# Patient Record
Sex: FEMALE | Race: BLACK OR AFRICAN AMERICAN | Employment: OTHER | URBAN - METROPOLITAN AREA
[De-identification: names, ages, dates, MRNs, and addresses within clinical notes are randomized per-mention and may not be internally consistent; named-entity substitution may affect disease eponyms.]

---

## 2017-03-27 DIAGNOSIS — I10 ESSENTIAL HYPERTENSION: ICD-10-CM

## 2017-03-27 RX ORDER — LISINOPRIL 40 MG/1
TABLET ORAL
Qty: 30 TAB | Refills: 0 | Status: SHIPPED | OUTPATIENT
Start: 2017-03-27 | End: 2017-04-22 | Stop reason: SDUPTHER

## 2017-03-27 NOTE — TELEPHONE ENCOUNTER
Attempted to contact patient without success.  Left voicemail message requesting a call back to the office to schedule a HTN follow up in the next 3 weeks

## 2017-04-25 ENCOUNTER — OFFICE VISIT (OUTPATIENT)
Dept: INTERNAL MEDICINE CLINIC | Age: 67
End: 2017-04-25

## 2017-04-25 VITALS
TEMPERATURE: 98.2 F | HEART RATE: 76 BPM | RESPIRATION RATE: 18 BRPM | BODY MASS INDEX: 24.94 KG/M2 | HEIGHT: 60 IN | SYSTOLIC BLOOD PRESSURE: 130 MMHG | OXYGEN SATURATION: 98 % | DIASTOLIC BLOOD PRESSURE: 72 MMHG | WEIGHT: 127 LBS

## 2017-04-25 VITALS
WEIGHT: 124 LBS | RESPIRATION RATE: 18 BRPM | OXYGEN SATURATION: 98 % | BODY MASS INDEX: 24.35 KG/M2 | HEIGHT: 60 IN | SYSTOLIC BLOOD PRESSURE: 130 MMHG | HEART RATE: 76 BPM | DIASTOLIC BLOOD PRESSURE: 82 MMHG | TEMPERATURE: 98.2 F

## 2017-04-25 DIAGNOSIS — I25.83 CORONARY ARTERY DISEASE DUE TO LIPID RICH PLAQUE: ICD-10-CM

## 2017-04-25 DIAGNOSIS — Z13.39 SCREENING FOR ALCOHOLISM: ICD-10-CM

## 2017-04-25 DIAGNOSIS — I10 ESSENTIAL HYPERTENSION: ICD-10-CM

## 2017-04-25 DIAGNOSIS — Z00.00 ROUTINE GENERAL MEDICAL EXAMINATION AT A HEALTH CARE FACILITY: ICD-10-CM

## 2017-04-25 DIAGNOSIS — Z12.39 BREAST CANCER SCREENING: Primary | ICD-10-CM

## 2017-04-25 DIAGNOSIS — Z11.59 ENCOUNTER FOR HEPATITIS C SCREENING TEST FOR LOW RISK PATIENT: ICD-10-CM

## 2017-04-25 DIAGNOSIS — Z12.11 COLON CANCER SCREENING: ICD-10-CM

## 2017-04-25 DIAGNOSIS — I25.10 CORONARY ARTERY DISEASE DUE TO LIPID RICH PLAQUE: ICD-10-CM

## 2017-04-25 NOTE — PROGRESS NOTES
Chief Complaint   Patient presents with    Hypertension     1. Have you been to the ER, urgent care clinic since your last visit? No  Hospitalized since your last visit? No    2. Have you seen or consulted any other health care providers outside of the [address] [place] [address] since your last visit? No Include any pap smears or colon screening.  No

## 2017-04-25 NOTE — MR AVS SNAPSHOT
Visit Information Date & Time Provider Department Dept. Phone Encounter #  
 4/25/2017 11:00 AM Yumi Rees MD [facility] [phone] [id number] Follow-up Instructions Return in about 6 months (around 10/25/2017) for F/U HTN. Upcoming Health Maintenance Date Due Hepatitis C Screening 1950 COLONOSCOPY 2/7/1967 DTaP/Tdap/Td series (1 - Tdap) 2/7/1970 Pneumococcal 65+ Low/Medium Risk (1 of 2 - PCV13) 2/7/2014 MEDICARE YEARLY EXAM 2/7/2014 BREAST CANCER SCRN MAMMOGRAM 7/26/2015 GLAUCOMA SCREENING Q2Y 11/12/2016 Allergies as of 4/25/2017  Review Complete On: 4/25/2017 By: Yumi Rees MD  
 No Known Allergies Current Immunizations  Never Reviewed No immunizations on file. Not reviewed this visit You Were Diagnosed With   
  
 Codes Comments Breast cancer screening    -  Primary ICD-10-CM: Z12.39 
ICD-9-CM: V76.10 Routine general medical examination at a health care facility     ICD-10-CM: Z00.00 ICD-9-CM: V70.0 Screening for alcoholism     ICD-10-CM: Z13.89 ICD-9-CM: V79.1 Colon cancer screening     ICD-10-CM: Z12.11 ICD-9-CM: V76.51 Encounter for hepatitis C screening test for low risk patient     ICD-10-CM: Z11.59 
ICD-9-CM: V73.89 Vitals BP Pulse Temp Resp Height(growth percentile) Weight(growth percentile) 130/72 (BP 1 Location: Left arm, BP Patient Position: Sitting) 76 98.2 °F (36.8 °C) (Oral) 18 5' (1.524 m) 127 lb (57.6 kg) SpO2 BMI OB Status Smoking Status 98% 24.8 kg/m2 Postmenopausal Never Smoker BMI and BSA Data Body Mass Index Body Surface Area  
 24.8 kg/m 2 1.56 m 2 Preferred Pharmacy Pharmacy Name Phone [id number] [place] [phone] Your Updated Medication List  
  
   
This list is accurate as of: 4/25/17 11:59 AM.  Always use your most recent med list.  
  
  
  
  
 atorvastatin 80 mg tablet Commonly known as:  LIPITOR Take 80 mg by mouth daily. bisoprolol-hydroCHLOROthiazide 10-6.25 mg per tablet Commonly known as:  [facility] TAKE 1 TABLET BY MOUTH EVERY DAY  
  
 diltiazem hcl 240 mg Tb24 Take  by mouth. DULoxetine 30 mg capsule Commonly known as:  CYMBALTA Take 1 Cap by mouth daily. Indications: CHRONIC MUSCULOSKELETAL PAIN, FIBROMYALGIA  
  
 eplerenone 25 mg tablet Commonly known as:  [place] [facility] Take one tablet by mouth once daily  
  
 hydrocortisone valerate 0.2 % ointment Commonly known as:  WEST-CORRIE Apply  to affected area daily. lisinopril 40 mg tablet Commonly known as:  PRINIVIL, ZESTRIL  
TAKE 1 TABLET BY MOUTH EVERY DAY  
  
 NITROSTAT 0.4 mg SL tablet Generic drug:  nitroglycerin  
by SubLINGual route every five (5) minutes as needed for Chest Pain. RESTASIS 0.05 % ophthalmic emulsion Generic drug:  cycloSPORINE Administer 1 drop to both eyes two (2) times a day. We Performed the Following CBC WITH AUTOMATED DIFF [34456 CPT(R)] HCV AB W/RFLX TO KRZYSZTOF [20557 CPT(R)] LIPID PANEL [53426 CPT(R)] METABOLIC PANEL, COMPREHENSIVE [75250 CPT(R)] REFERRAL TO GASTROENTEROLOGY [QUB24 Custom] TSH REFLEX TO T4 [[id number] Custom] URINALYSIS W/ RFLX MICROSCOPIC [76470 CPT(R)] Follow-up Instructions Return in about 6 months (around 10/25/2017) for F/U HTN. To-Do List   
 04/25/2017 Imaging:  [facility] MAMMO BI SCREENING INCL CAD Referral Information Referral ID Referred By Referred To  
  
 [id number] [facility] [address] [place] [address] [phone] Visits Status Start Date End Date 1 New Request 4/25/17 4/25/18 If your referral has a status of pending review or denied, additional information will be sent to support the outcome of this decision. Patient Instructions Follow a low salt diet. Stay physically active. Have a mammogram done. Have a colonoscopy scheduled. Have blood test analysis today. Continue your current medications. Introducing [facility]! [facility] introduces [employer] patient portal. Now you can access parts of your medical record, email your doctor's office, and request medication refills online. 1. In your internet browser, go to [email or web address] 2. Click on the First Time User? Click Here link in the Sign In box. You will see the New Member Sign Up page. 3. Enter your [employer] Access Code exactly as it appears below. You will not need to use this code after youve completed the sign-up process. If you do not sign up before the expiration date, you must request a new code. · [employer] Access Code: [email or web address] Expires: 7/24/2017 11:59 AM 
 
4. Enter the last four digits of your Social Security Number (xxxx) and Date of Birth (mm/dd/yyyy) as indicated and click Submit. You will be taken to the next sign-up page. 5. Create a [employer] ID. This will be your [employer] login ID and cannot be changed, so think of one that is secure and easy to remember. 6. Create a [employer] password. You can change your password at any time. 7. Enter your Password Reset Question and Answer. This can be used at a later time if you forget your password. 8. Enter your e-mail address. You will receive e-mail notification when new information is available in [address] 9. Click Sign Up. You can now view and download portions of your medical record. 10. Click the Download Summary menu link to download a portable copy of your medical information. If you have questions, please visit the Frequently Asked Questions section of the [employer] website. Remember, [employer] is NOT to be used for urgent needs. For medical emergencies, dial 911. Now available from your iPhone and Android! Please provide this summary of care documentation to your next provider. Your primary care clinician is listed as Jesus Jacobs. If you have any questions after today's visit, please call [phone].

## 2017-04-25 NOTE — PROGRESS NOTES
Subjective:     Chief Complaint   Patient presents with    Hypertension     She  is a 75y.o. year old female who presents for evaluation. Has ringing in her ears. Is overdue for mammogram and colonoscopy. Historical Data    Past Medical History:   Diagnosis Date    CAD (coronary artery disease)     HTN (hypertension)     Osteoporosis         Past Surgical History:   Procedure Laterality Date    CARDIAC SURG PROCEDURE UNLIST      2 stents placed at the first of the year.  HX APPENDECTOMY      HX CHOLECYSTECTOMY          Outpatient Encounter Prescriptions as of 4/25/2017   Medication Sig Dispense Refill    lisinopril (PRINIVIL, ZESTRIL) 40 mg tablet TAKE 1 TABLET BY MOUTH EVERY DAY 30 Tab 5    bisoprolol-hydroCHLOROthiazide (ZIAC) 10-6.25 mg per tablet TAKE 1 TABLET BY MOUTH EVERY DAY 90 Tab 1    hydrocortisone valerate (WEST-CORRIE) 0.2 % ointment Apply  to affected area daily. 15 g 0    DULoxetine (CYMBALTA) 30 mg capsule Take 1 Cap by mouth daily. Indications: CHRONIC MUSCULOSKELETAL PAIN, FIBROMYALGIA 30 Cap 1    diltiazem hcl 240 mg Tb24 Take  by mouth.  atorvastatin (LIPITOR) 80 mg tablet Take 80 mg by mouth daily.  cycloSPORINE (RESTASIS) 0.05 % ophthalmic emulsion Administer 1 drop to both eyes two (2) times a day.  nitroglycerin (NITROSTAT) 0.4 mg SL tablet by SubLINGual route every five (5) minutes as needed for Chest Pain.  eplerenone (INSPRA) 25 mg tablet Take one tablet by mouth once daily 90 tablet 3     No facility-administered encounter medications on file as of 4/25/2017. No Known Allergies     Social History     Social History    Marital status: SINGLE     Spouse name: N/A    Number of children: N/A    Years of education: N/A     Occupational History    Not on file.      Social History Main Topics    Smoking status: Never Smoker    Smokeless tobacco: Never Used    Alcohol use No    Drug use: No    Sexual activity: Not Currently     Other Topics Concern    Not on file     Social History Narrative        Review of Systems  A comprehensive review of systems was negative except for that written in the HPI. Objective:     Vitals:    04/25/17 1134   BP: 130/72   Pulse: 76   Resp: 18   Temp: 98.2 °F (36.8 °C)   TempSrc: Oral   SpO2: 98%   Weight: 127 lb (57.6 kg)   Height: 5' (1.524 m)     Pleasant AAF in no acute distress. Neck: Supple. Cardiac: RRR without murmurs, gallops or rubs. Lungs: Clear to auscultation. Breasts (chaperone): No masses. Abdomen: Soft and nontender  Neuro: Intact. ASSESSMENT / PLAN:   1. Routine general medical examination at a health care facility  · Wellness exam completed  - CBC WITH AUTOMATED DIFF  - LIPID PANEL  - TSH REFLEX TO T4  - METABOLIC PANEL, COMPREHENSIVE  - URINALYSIS W/ RFLX MICROSCOPIC    2. Screening for alcoholism      3. Breast cancer screening    - [facility] MAMMO BI SCREENING INCL CAD; Future    4. Colon cancer screening    - REFERRAL TO GASTROENTEROLOGY    5. Encounter for hepatitis C screening test for low risk patient    - HCV AB W/RFLX TO KRZYSZTOF    6. Essential hypertension  · Low salt diet. · Continue current medications. 7. Coronary artery disease due to lipid rich plaque  · Continue secondary risk factor reduction measures. Patient Instructions   Follow a low salt diet. Stay physically active. Have a mammogram done. Have a colonoscopy scheduled. Have blood test analysis today. Continue your current medications. Follow-up Disposition:  Return in about 6 months (around 10/25/2017) for F/U HTN. Advised her to call back or return to office if symptoms worsen/change/persist.  Discussed expected course/resolution/complications of diagnosis in detail with patient. Medication risks/benefits/costs/interactions/alternatives discussed with patient. She was given an after visit summary which includes diagnoses, current medications, & vitals.   She expressed understanding with the diagnosis and plan.                 This is a Subsequent Medicare Annual Wellness Visit providing Personalized Prevention Plan Services (PPPS) (Performed 12 months after initial AWV and PPPS )    I have reviewed the patient's medical history in detail and updated the computerized patient record. History     Past Medical History:   Diagnosis Date    CAD (coronary artery disease)     HTN (hypertension)     Osteoporosis       Past Surgical History:   Procedure Laterality Date    CARDIAC SURG PROCEDURE UNLIST      2 stents placed at the first of the year.  HX APPENDECTOMY      HX CHOLECYSTECTOMY       Current Outpatient Prescriptions   Medication Sig Dispense Refill    lisinopril (PRINIVIL, ZESTRIL) 40 mg tablet TAKE 1 TABLET BY MOUTH EVERY DAY 30 Tab 5    bisoprolol-hydroCHLOROthiazide (ZIAC) 10-6.25 mg per tablet TAKE 1 TABLET BY MOUTH EVERY DAY 90 Tab 1    hydrocortisone valerate (WEST-CORRIE) 0.2 % ointment Apply  to affected area daily. 15 g 0    DULoxetine (CYMBALTA) 30 mg capsule Take 1 Cap by mouth daily. Indications: CHRONIC MUSCULOSKELETAL PAIN, FIBROMYALGIA 30 Cap 1    diltiazem hcl 240 mg Tb24 Take  by mouth.  atorvastatin (LIPITOR) 80 mg tablet Take 80 mg by mouth daily.  cycloSPORINE (RESTASIS) 0.05 % ophthalmic emulsion Administer 1 drop to both eyes two (2) times a day.  nitroglycerin (NITROSTAT) 0.4 mg SL tablet by SubLINGual route every five (5) minutes as needed for Chest Pain.       eplerenone (INSPRA) 25 mg tablet Take one tablet by mouth once daily 90 tablet 3     No Known Allergies  Family History   Problem Relation Age of Onset    Heart Disease Mother     Heart Disease Father     Stroke Father     Diabetes Father     Cancer Sister      breast    Diabetes Sister     Kidney Disease Sister      Social History   Substance Use Topics    Smoking status: Never Smoker    Smokeless tobacco: Never Used    Alcohol use No     Patient Active Problem List   Diagnosis Code    Intermediate coronary syndrome (HCC) I20.0    Essential hypertension I10    Coronary artery disease due to lipid rich plaque I25.10, I25.83       Depression Risk Factor Screening:     PHQ 2 / 9, over the last two weeks 4/25/2017   Little interest or pleasure in doing things Not at all   Feeling down, depressed or hopeless Not at all   Total Score PHQ 2 0     Alcohol Risk Factor Screening: On any occasion during the past 3 months, have you had more than 3 drinks containing alcohol? No    Do you average more than 7 drinks per week? No      Functional Ability and Level of Safety:     Hearing Loss   moderate    Activities of Daily Living   Self-care. Requires assistance with: no ADLs    Fall Risk     Fall Risk Assessment, last 12 mths 4/25/2017   Able to walk? Yes   Fall in past 12 months? No   Fall with injury? -     Abuse Screen   Patient is not abused    Review of Systems   Pertinent items are noted in HPI. Physical Examination     Evaluation of Cognitive Function:  Mood/affect:  neutral  Appearance: age appropriate and casually dressed  Family member/caregiver input: none    No exam performed today, see elsewhere. Patient Care Team:  Reyes Bull, MD as PCP - General (Internal Medicine)    Advice/Referrals/Counseling   Education and counseling provided:  Are appropriate based on today's review and evaluation      Assessment/Plan       ICD-10-CM ICD-9-CM    1. Breast cancer screening Z12.39 V76.10 [facility] MAMMO BI SCREENING INCL CAD   2. Routine general medical examination at a health care facility Z00.00 V70.0 CBC WITH AUTOMATED DIFF      LIPID PANEL      TSH REFLEX TO T4      METABOLIC PANEL, COMPREHENSIVE      URINALYSIS W/ RFLX MICROSCOPIC   3. Screening for alcoholism Z13.89 V79.1    4. Colon cancer screening Z12.11 V76.51 REFERRAL TO GASTROENTEROLOGY   5. Encounter for hepatitis C screening test for low risk patient Z11.59 V73.89 HCV AB W/RFLX TO KRZYSZTOF   6.  Essential hypertension I10 401.9    7. Coronary artery disease due to lipid rich plaque I25.10 414.00     I25.83 414.3      Encounter Diagnoses   Name Primary?  Breast cancer screening Yes    Routine general medical examination at a health care facility     Screening for alcoholism     Colon cancer screening     Encounter for hepatitis C screening test for low risk patient     Essential hypertension     Coronary artery disease due to lipid rich plaque    .

## 2017-04-25 NOTE — PROGRESS NOTES
Chief Complaint   Patient presents with    Hypertension     1. Have you been to the ER, urgent care clinic since your last visit? No  Hospitalized since your last visit? No    2. Have you seen or consulted any other health care providers outside of the [facility] since your last visit? No  Include any pap smears or colon screening.  No

## 2017-04-25 NOTE — PATIENT INSTRUCTIONS
Follow a low salt diet. Stay physically active. Have a mammogram done. Have a colonoscopy scheduled. Have blood test analysis today. Continue your current medications.

## 2017-04-26 LAB
ALBUMIN SERPL-MCNC: 4.3 G/DL (ref 3.6–4.8)
ALBUMIN/GLOB SERPL: 1.7 {RATIO} (ref 1.2–2.2)
ALP SERPL-CCNC: 81 IU/L (ref 39–117)
ALT SERPL-CCNC: 18 IU/L (ref 0–32)
APPEARANCE UR: CLEAR
AST SERPL-CCNC: 13 IU/L (ref 0–40)
BASOPHILS # BLD AUTO: 0 X10E3/UL (ref 0–0.2)
BASOPHILS NFR BLD AUTO: 0 %
BILIRUB SERPL-MCNC: 0.7 MG/DL (ref 0–1.2)
BILIRUB UR QL STRIP: NEGATIVE
BUN SERPL-MCNC: 15 MG/DL (ref 8–27)
BUN/CREAT SERPL: 16 (ref 12–28)
CALCIUM SERPL-MCNC: 8.9 MG/DL (ref 8.7–10.3)
CHLORIDE SERPL-SCNC: 105 MMOL/L (ref 96–106)
CHOLEST SERPL-MCNC: 237 MG/DL (ref 100–199)
CO2 SERPL-SCNC: 26 MMOL/L (ref 18–29)
COLOR UR: YELLOW
CREAT SERPL-MCNC: 0.96 MG/DL (ref 0.57–1)
EOSINOPHIL # BLD AUTO: 0.1 X10E3/UL (ref 0–0.4)
EOSINOPHIL NFR BLD AUTO: 1 %
ERYTHROCYTE [DISTWIDTH] IN BLOOD BY AUTOMATED COUNT: 15.6 % (ref 12.3–15.4)
GLOBULIN SER CALC-MCNC: 2.6 G/DL (ref 1.5–4.5)
GLUCOSE SERPL-MCNC: 93 MG/DL (ref 65–99)
GLUCOSE UR QL: NEGATIVE
HCT VFR BLD AUTO: 38.1 % (ref 34–46.6)
HCV AB S/CO SERPL IA: <0.1 S/CO RATIO (ref 0–0.9)
HCV AB SERPL QL IA: NORMAL
HDLC SERPL-MCNC: 61 MG/DL
HGB BLD-MCNC: 12.6 G/DL (ref 11.1–15.9)
HGB UR QL STRIP: NEGATIVE
IMM GRANULOCYTES # BLD: 0 X10E3/UL (ref 0–0.1)
IMM GRANULOCYTES NFR BLD: 0 %
INTERPRETATION, 910389: NORMAL
KETONES UR QL STRIP: NEGATIVE
LDLC SERPL CALC-MCNC: 149 MG/DL (ref 0–99)
LEUKOCYTE ESTERASE UR QL STRIP: NEGATIVE
LYMPHOCYTES # BLD AUTO: 2.2 X10E3/UL (ref 0.7–3.1)
LYMPHOCYTES NFR BLD AUTO: 29 %
MCH RBC QN AUTO: 28.9 PG (ref 26.6–33)
MCHC RBC AUTO-ENTMCNC: 33.1 G/DL (ref 31.5–35.7)
MCV RBC AUTO: 87 FL (ref 79–97)
MICRO URNS: NORMAL
MONOCYTES # BLD AUTO: 0.5 X10E3/UL (ref 0.1–0.9)
MONOCYTES NFR BLD AUTO: 6 %
NEUTROPHILS # BLD AUTO: 4.9 X10E3/UL (ref 1.4–7)
NEUTROPHILS NFR BLD AUTO: 64 %
NITRITE UR QL STRIP: NEGATIVE
PH UR STRIP: 6 [PH] (ref 5–7.5)
PLATELET # BLD AUTO: 294 X10E3/UL (ref 150–379)
POTASSIUM SERPL-SCNC: 4 MMOL/L (ref 3.5–5.2)
PROT SERPL-MCNC: 6.9 G/DL (ref 6–8.5)
PROT UR QL STRIP: NEGATIVE
RBC # BLD AUTO: 4.36 X10E6/UL (ref 3.77–5.28)
SODIUM SERPL-SCNC: 146 MMOL/L (ref 134–144)
SP GR UR: 1.01 (ref 1–1.03)
TRIGL SERPL-MCNC: 135 MG/DL (ref 0–149)
TSH SERPL DL<=0.005 MIU/L-ACNC: 2.56 UIU/ML (ref 0.45–4.5)
UROBILINOGEN UR STRIP-MCNC: 0.2 MG/DL (ref 0.2–1)
VLDLC SERPL CALC-MCNC: 27 MG/DL (ref 5–40)
WBC # BLD AUTO: 7.8 X10E3/UL (ref 3.4–10.8)

## 2017-05-16 ENCOUNTER — HOSPITAL ENCOUNTER (OUTPATIENT)
Dept: MAMMOGRAPHY | Age: 67
Discharge: HOME | End: 2017-05-16
Payer: MEDICARE

## 2017-05-16 DIAGNOSIS — Z12.31 VISIT FOR SCREENING MAMMOGRAM: ICD-10-CM

## 2017-05-16 PROCEDURE — 77067 SCR MAMMO BI INCL CAD: CPT

## 2018-01-01 ENCOUNTER — APPOINTMENT (OUTPATIENT)
Dept: GENERAL RADIOLOGY | Age: 68
End: 2018-01-01
Attending: EMERGENCY MEDICINE
Payer: MEDICARE

## 2018-01-01 ENCOUNTER — HOSPITAL ENCOUNTER (EMERGENCY)
Age: 68
Discharge: HOME | End: 2018-01-23
Attending: EMERGENCY MEDICINE | Admitting: EMERGENCY MEDICINE
Payer: MEDICARE

## 2018-01-01 VITALS
OXYGEN SATURATION: 98 % | BODY MASS INDEX: 26.01 KG/M2 | SYSTOLIC BLOOD PRESSURE: 191 MMHG | DIASTOLIC BLOOD PRESSURE: 106 MMHG | HEART RATE: 100 BPM | HEIGHT: 61 IN | WEIGHT: 137.8 LBS | TEMPERATURE: 99.7 F | RESPIRATION RATE: 24 BRPM

## 2018-01-01 DIAGNOSIS — J20.9 ACUTE BRONCHITIS, UNSPECIFIED ORGANISM: ICD-10-CM

## 2018-01-01 DIAGNOSIS — I10 ESSENTIAL HYPERTENSION: Primary | ICD-10-CM

## 2018-01-01 DIAGNOSIS — J06.9 ACUTE UPPER RESPIRATORY INFECTION: ICD-10-CM

## 2018-01-01 LAB
ATRIAL RATE: 92 BPM
CALCULATED P AXIS, ECG09: 36 DEGREES
CALCULATED R AXIS, ECG10: -45 DEGREES
CALCULATED T AXIS, ECG11: 87 DEGREES
DIAGNOSIS, 93000: NORMAL
P-R INTERVAL, ECG05: 146 MS
Q-T INTERVAL, ECG07: 410 MS
QRS DURATION, ECG06: 130 MS
QTC CALCULATION (BEZET), ECG08: 507 MS
VENTRICULAR RATE, ECG03: 92 BPM

## 2018-01-01 PROCEDURE — 74011250637 HC RX REV CODE- 250/637: Performed by: EMERGENCY MEDICINE

## 2018-01-01 PROCEDURE — 99285 EMERGENCY DEPT VISIT HI MDM: CPT

## 2018-01-01 PROCEDURE — 71046 X-RAY EXAM CHEST 2 VIEWS: CPT

## 2018-01-01 PROCEDURE — 93005 ELECTROCARDIOGRAM TRACING: CPT

## 2018-01-01 RX ORDER — LISINOPRIL 20 MG/1
40 TABLET ORAL
Status: COMPLETED | OUTPATIENT
Start: 2018-01-01 | End: 2018-01-01

## 2018-01-01 RX ORDER — PREDNISONE 50 MG/1
50 TABLET ORAL DAILY
Qty: 3 TAB | Refills: 0 | Status: SHIPPED | OUTPATIENT
Start: 2018-01-01 | End: 2018-01-01

## 2018-01-01 RX ORDER — BENZONATATE 100 MG/1
100 CAPSULE ORAL
Qty: 30 CAP | Refills: 0 | Status: SHIPPED | OUTPATIENT
Start: 2018-01-01 | End: 2018-01-01

## 2018-01-01 RX ORDER — DILTIAZEM HYDROCHLORIDE 120 MG/1
240 CAPSULE, COATED, EXTENDED RELEASE ORAL
Status: COMPLETED | OUTPATIENT
Start: 2018-01-01 | End: 2018-01-01

## 2018-01-01 RX ORDER — DILTIAZEM HYDROCHLORIDE 60 MG/1
240 TABLET, FILM COATED ORAL
Status: DISCONTINUED | OUTPATIENT
Start: 2018-01-01 | End: 2018-01-01

## 2018-01-01 RX ORDER — LISINOPRIL 20 MG/1
40 TABLET ORAL
Status: DISCONTINUED | OUTPATIENT
Start: 2018-01-01 | End: 2018-01-01 | Stop reason: CLARIF

## 2018-01-01 RX ADMIN — LISINOPRIL 40 MG: 20 TABLET ORAL at 19:19

## 2018-01-01 RX ADMIN — DILTIAZEM HYDROCHLORIDE 240 MG: 120 CAPSULE, COATED, EXTENDED RELEASE ORAL at 19:18

## 2018-01-24 NOTE — ED NOTES
Yellow  requesting to ask to this writer, requesting that patient be assisted out of his cab since she is refusing and does not have a means to pay for cab despite her telling her primary nurse that she had cash to pay for cab. Yellow ticket provided after speaking with patient who now denies having any money to pay for cab.

## 2018-01-24 NOTE — ED PROVIDER NOTES
EMERGENCY DEPARTMENT HISTORY AND PHYSICAL EXAM      Date: 1/23/2018  Patient Name: Lizzy Lawrence    History of Presenting Illness     Chief Complaint   Patient presents with    Sore Throat     [facility] for [facility], referred to ED for hypertensive    Hypertension     [facility] noted BP med's 200/110; pt did not take BP med's today; pt denies CP, SOB       History Provided By: Patient    HPI: Lizzy Lawrence, 75 y.o. female with PMHx significant for CAD, HTN, presents via EMS to the ED for evaluation of elevated blood pressure with dry cough, sore throat, rhinorrhea, congestion and generalized myalgias x yesterday evening. Pt was seen at [facility] for her sxs today and was dx'd with the flu. However her BP was 200's/100's, so she was sent here for further evaluation. She has not taken her BP medications today. She specifically denies fever, CP or SOB. PCP: Nicole Guillermo MD     There are no other complaints, changes, or physical findings at this time. Current Outpatient Prescriptions   Medication Sig Dispense Refill    benzonatate (TESSALON PERLES) 100 mg capsule Take 1 Cap by mouth three (3) times daily as needed for Cough for up to 7 days. 30 Cap 0    predniSONE (DELTASONE) 50 mg tablet Take 1 Tab by mouth daily for 3 days. 3 Tab 0    bisoprolol-hydroCHLOROthiazide (ZIAC) 10-6.25 mg per tablet TAKE 1 TABLET BY MOUTH EVERY DAY 90 Tab 2    lisinopril (PRINIVIL, ZESTRIL) 40 mg tablet TAKE 1 TABLET BY MOUTH EVERY DAY 30 Tab 5    hydrocortisone valerate (WEST-CORRIE) 0.2 % ointment Apply  to affected area daily. 15 g 0    DULoxetine (CYMBALTA) 30 mg capsule Take 1 Cap by mouth daily. Indications: CHRONIC MUSCULOSKELETAL PAIN, FIBROMYALGIA 30 Cap 1    diltiazem hcl 240 mg Tb24 Take  by mouth.  atorvastatin (LIPITOR) 80 mg tablet Take 80 mg by mouth daily.  cycloSPORINE (RESTASIS) 0.05 % ophthalmic emulsion Administer 1 drop to both eyes two (2) times a day.       nitroglycerin (NITROSTAT) 0.4 mg SL tablet by SubLINGual route every five (5) minutes as needed for Chest Pain.  eplerenone (INSPRA) 25 mg tablet Take one tablet by mouth once daily 90 tablet 3       Past History     Past Medical History:  Past Medical History:   Diagnosis Date    CAD (coronary artery disease)     HTN (hypertension)     Osteoporosis        Past Surgical History:  Past Surgical History:   Procedure Laterality Date    CARDIAC SURG PROCEDURE UNLIST      2 stents placed at the first of the year.  HX APPENDECTOMY      HX CHOLECYSTECTOMY         Family History:  Family History   Problem Relation Age of Onset    Heart Disease Mother     Heart Disease Father     Stroke Father     Diabetes Father     Cancer Sister      breast    Diabetes Sister     Kidney Disease Sister     Breast Cancer Sister 71       Social History:  Social History   Substance Use Topics    Smoking status: Never Smoker    Smokeless tobacco: Never Used    Alcohol use No       Allergies:  No Known Allergies      Review of Systems   Review of Systems   Constitutional: Negative for chills and fever. HENT: Positive for congestion, rhinorrhea and sore throat. Respiratory: Positive for cough (dry). Negative for shortness of breath. Cardiovascular: Negative for chest pain. + elevated BP   Gastrointestinal: Negative for constipation, diarrhea, nausea and vomiting. Musculoskeletal: Positive for myalgias (generalized). Neurological: Negative for weakness and numbness. All other systems reviewed and are negative. Physical Exam   Physical Exam   Constitutional: She is oriented to person, place, and time. She appears well-developed and well-nourished. HENT:   Head: Normocephalic and atraumatic. Eyes: Conjunctivae and EOM are normal.   Neck: Normal range of motion. Neck supple. Cardiovascular: Normal rate and regular rhythm.     Pulmonary/Chest: Effort normal and breath sounds normal. No respiratory distress. Dry cough   Abdominal: Soft. She exhibits no distension. There is no tenderness. Musculoskeletal: Normal range of motion. Neurological: She is alert and oriented to person, place, and time. Skin: Skin is warm and dry. Psychiatric: Her mood appears anxious. Nursing note and vitals reviewed. Diagnostic Study Results     Labs -     Recent Results (from the past 12 hour(s))   EKG, 12 LEAD, INITIAL    Collection Time: 01/23/18  5:19 PM   Result Value Ref Range    Ventricular Rate 92 BPM    Atrial Rate 92 BPM    P-R Interval 146 ms    QRS Duration 130 ms    Q-T Interval 410 ms    QTC Calculation (Bezet) 507 ms    Calculated P Axis 36 degrees    Calculated R Axis -45 degrees    Calculated T Axis 87 degrees    Diagnosis       Normal sinus rhythm  Left axis deviation  Left bundle branch block  Abnormal ECG  When compared with ECG of 09-NOV-2014 12:30,  Left bundle branch block is now present         Radiologic Studies -   CXR Results  (Last 48 hours)               01/23/18 1907  XR CHEST PA LAT Final result    Impression:  IMPRESSION: No acute cardiopulmonary disease. Narrative: Indication:  cough. Sore throat, hypertension. Exam: PA and lateral views of the chest.       Direct comparison is made to prior CXR dated 11/2011. Findings: Cardiomediastinal silhouette is within normal limits. Lungs are clear   bilaterally. Pleural spaces are normal. Osseous structures are intact. Medical Decision Making   I am the [facility] provider for this patient. I reviewed the vital signs, available nursing notes, past medical history, past surgical history, family history and social history. Vital Signs-Reviewed the patient's vital signs.   Patient Vitals for the past 12 hrs:   Temp Pulse Resp BP SpO2   01/23/18 1843 - 100 24 - 98 %   01/23/18 1842 - 100 - (!) 191/106 99 %   01/23/18 1711 99.7 °F (37.6 °C) 97 18 (!) 205/115 100 %       EKG interpretation: (Preliminary) 1856  Rhythm: normal sinus rhythm, LBBB; and regular . Rate (approx.): 92 bpm; Axis: left axis deviation; CA interval: normal; QRS interval: normal ; ST/T wave: normal; Other findings: unchanged from previous ekg. Written by Paula Camarillo ED Scribe, as dictated by Jelly Spears M.D.. Records Reviewed: Nursing Notes and Old Medical Records    Provider Notes (Medical Decision Making):   Patient presents with asymptomatic hypertension. Likely essential hypertension rather than secondary from renal or endocrine cause. No symptoms like CP or SOB or HA to be concerned about end-organ damage and malignant hypertension at this time. Discussed sodium restriction, maintaining ideal body weight and regular exercise program as physiologic means to achieve blood pressure control. The patient will strive towards this. Meanwhile, it is appropriate to lower BP with medications, while observing for therapeutic effect and if appropriate later, can discuss discontinuing medications with his primary care physician if physiologic methods appear to be effective. The patient indicates understanding of these issues and agrees with the plan. We have agreed that continuing to lower BP in the Emergency Room at this point could be more deleterious than therapeutic. Discussed the long term sequelae for elevated blood pressure including blindness, CVA, MI, and renal failure/ dialysis. Pt agrees to follow up as directed. The patient complains of nasal congestion, rhinorrhea, and cough. DDx: viral URI, acute bronchitis, bacterial sinusitis vs. pharyngitis, migraine, flu. Will get CXR to r/o PNA and treat symptoms. ED Course:   Initial assessment performed. The patients presenting problems have been discussed, and they are in agreement with the care plan formulated and outlined with them. I have encouraged them to ask questions as they arise throughout their visit.     Disposition:  DISCHARGE NOTE:  9:22 PM  Pt has been reexamined. Pt has no new complaints, changes, or physical findings. Care plan outlined and precautions discussed. All available results reviewed with pt. All medications reviewed with pt. All of pts questions and concerns addressed. Pt agrees to f/u as instructed and agrees to return to ED upon further deterioration. Pt is ready to go home. Written by [facility] ED Scribe as dictated by Mercedes Oshea M.D. PLAN:  1. Discharge Medication List as of 1/23/2018  7:39 PM      START taking these medications    Details   benzonatate (TESSALON PERLES) 100 mg capsule Take 1 Cap by mouth three (3) times daily as needed for Cough for up to 7 days. , Normal, Disp-30 Cap, R-0      predniSONE (DELTASONE) 50 mg tablet Take 1 Tab by mouth daily for 3 days. , Normal, Disp-3 Tab, R-0         CONTINUE these medications which have NOT CHANGED    Details   bisoprolol-hydroCHLOROthiazide (ZIAC) 10-6.25 mg per tablet TAKE 1 TABLET BY MOUTH EVERY DAY, Normal, Disp-90 Tab, R-2      lisinopril (PRINIVIL, ZESTRIL) 40 mg tablet TAKE 1 TABLET BY MOUTH EVERY DAY, Normal, Disp-30 Tab, R-5      hydrocortisone valerate (WEST-CORRIE) 0.2 % ointment Apply  to affected area daily. , Normal, Disp-15 g, R-0      DULoxetine (CYMBALTA) 30 mg capsule Take 1 Cap by mouth daily. Indications: CHRONIC MUSCULOSKELETAL PAIN, FIBROMYALGIA, Normal, Disp-30 Cap, R-1      diltiazem hcl 240 mg Tb24 Take  by mouth., Historical Med      atorvastatin (LIPITOR) 80 mg tablet Take 80 mg by mouth daily. , Historical Med      cycloSPORINE (RESTASIS) 0.05 % ophthalmic emulsion Administer 1 drop to both eyes two (2) times a day., Historical Med      nitroglycerin (NITROSTAT) 0.4 mg SL tablet by SubLINGual route every five (5) minutes as needed for Chest Pain., Historical Med      eplerenone (INSPRA) 25 mg tablet Take one tablet by mouth once daily, Normal, Disp-90 tablet, R-3           2.    Follow-up Information     Follow up With Details Comments Contact Info Bob Del Cid MD  As needed [address] [place] [address]   [address] [facility]  [place] [address] [phone]          Return to ED if worse     Diagnosis     Clinical Impression:   1. Essential hypertension    2. Acute bronchitis, unspecified organism    3. Acute upper respiratory infection        Attestations: This note is prepared by Mike Gusman acting as scribe for Jean Joshi M.D. Jean Joshi M.D. : The scribe's documentation has been prepared under my direction and personally reviewed by me in its entirety. I confirm that the note above accurately reflects all work, treatment, procedures, and medical decision making performed by me.

## 2018-01-24 NOTE — ED NOTES
Late note: called on pod phone by charge RN. Pt in cab stating she has no money to pay.  requesting pt get out of cab. Charge RN asking if it was verified if pt knew she would be paying for her own cab ride. It was verified and charted as self pay. Pt was aware she was paying for her own ride. Pt asked what cab company was called. Pt was advised it was [email or web address]  Pt stated \"well you called the most expensive one. \"

## 2018-01-24 NOTE — DISCHARGE INSTRUCTIONS
Bronchitis: Care Instructions  Your Care Instructions    Bronchitis is inflammation of the bronchial tubes, which carry air to the lungs. The tubes swell and produce mucus, or phlegm. The mucus and inflamed bronchial tubes make you cough. You may have trouble breathing. Most cases of bronchitis are caused by viruses like those that cause colds. Antibiotics usually do not help and they may be harmful. Bronchitis usually develops rapidly and lasts about 2 to 3 weeks in otherwise healthy people. Follow-up care is a key part of your treatment and safety. Be sure to make and go to all appointments, and call your doctor if you are having problems. It's also a good idea to know your test results and keep a list of the medicines you take. How can you care for yourself at home? · Take all medicines exactly as prescribed. Call your doctor if you think you are having a problem with your medicine. · Get some extra rest.  · Take an over-the-counter pain medicine, such as acetaminophen (Tylenol), ibuprofen (Advil, Motrin), or naproxen (Aleve) to reduce fever and relieve body aches. Read and follow all instructions on the label. · Do not take two or more pain medicines at the same time unless the doctor told you to. Many pain medicines have acetaminophen, which is Tylenol. Too much acetaminophen (Tylenol) can be harmful. · Take an over-the-counter cough medicine that contains dextromethorphan to help quiet a dry, hacking cough so that you can sleep. Avoid cough medicines that have more than one active ingredient. Read and follow all instructions on the label. · Breathe moist air from a humidifier, hot shower, or sink filled with hot water. The heat and moisture will thin mucus so you can cough it out. · Do not smoke. Smoking can make bronchitis worse. If you need help quitting, talk to your doctor about stop-smoking programs and medicines. These can increase your chances of quitting for good.   When should you call for help? Call 911 anytime you think you may need emergency care. For example, call if:  ? · You have severe trouble breathing. ?Call your doctor now or seek immediate medical care if:  ? · You have new or worse trouble breathing. ? · You cough up dark brown or bloody mucus (sputum). ? · You have a new or higher fever. ? · You have a new rash. ? Watch closely for changes in your health, and be sure to contact your doctor if:  ? · You cough more deeply or more often, especially if you notice more mucus or a change in the color of your mucus. ? · You are not getting better as expected. Where can you learn more? Go to http://yobani-luan.info/. Enter H333 in the search box to learn more about \"Bronchitis: Care Instructions. \"  Current as of: May 12, 2017  Content Version: 11.4  © 8108-9433 [employer]. Care instructions adapted under license by [employer] (which disclaims liability or warranty for this information). If you have questions about a medical condition or this instruction, always ask your healthcare professional. [address] any warranty or liability for your use of this information.

## 2018-01-24 NOTE — ED NOTES
This RN was giving the Lisinopril when the patient dropped the medication on the floor. The patient did take one 20 mg of Lisinopril, but did not take the one that was dropped on the floor. Consulted Pharmacy. Patient only needs one 20 mg of Lisinopril.